# Patient Record
Sex: FEMALE | Race: WHITE | NOT HISPANIC OR LATINO
[De-identification: names, ages, dates, MRNs, and addresses within clinical notes are randomized per-mention and may not be internally consistent; named-entity substitution may affect disease eponyms.]

---

## 2017-01-05 ENCOUNTER — MESSAGE (OUTPATIENT)
Age: 82
End: 2017-01-05

## 2017-01-05 RX ORDER — CALCIUM CARBONATE 160(400)MG
TABLET,CHEWABLE ORAL
Qty: 1 | Refills: 0 | Status: ACTIVE | OUTPATIENT
Start: 2017-01-05

## 2017-01-11 ENCOUNTER — MESSAGE (OUTPATIENT)
Age: 82
End: 2017-01-11

## 2017-01-19 ENCOUNTER — INPATIENT (INPATIENT)
Facility: HOSPITAL | Age: 82
LOS: 1 days | Discharge: HOME | DRG: 641 | End: 2017-01-21
Attending: INTERNAL MEDICINE | Admitting: INTERNAL MEDICINE
Payer: COMMERCIAL

## 2017-01-19 VITALS
RESPIRATION RATE: 18 BRPM | OXYGEN SATURATION: 96 % | TEMPERATURE: 98 F | HEART RATE: 65 BPM | DIASTOLIC BLOOD PRESSURE: 60 MMHG | SYSTOLIC BLOOD PRESSURE: 95 MMHG

## 2017-01-19 LAB
APPEARANCE UR: CLEAR — SIGNIFICANT CHANGE UP
BILIRUB UR-MCNC: NEGATIVE — SIGNIFICANT CHANGE UP
COLOR SPEC: YELLOW — SIGNIFICANT CHANGE UP
DIFF PNL FLD: NEGATIVE — SIGNIFICANT CHANGE UP
EXTRA SST TUBE: SIGNIFICANT CHANGE UP
GLUCOSE UR QL: NEGATIVE — SIGNIFICANT CHANGE UP
KETONES UR-MCNC: NEGATIVE — SIGNIFICANT CHANGE UP
LACTATE SERPL-SCNC: 0.9 MMOL/L — SIGNIFICANT CHANGE UP (ref 0.5–2)
LEUKOCYTE ESTERASE UR-ACNC: (no result)
NITRITE UR-MCNC: NEGATIVE — SIGNIFICANT CHANGE UP
PH UR: 6 — SIGNIFICANT CHANGE UP (ref 4–8)
PROT UR-MCNC: NEGATIVE MG/DL — SIGNIFICANT CHANGE UP
SP GR SPEC: 1.01 — SIGNIFICANT CHANGE UP (ref 1–1.03)
UROBILINOGEN FLD QL: 0.2 E.U./DL — SIGNIFICANT CHANGE UP

## 2017-01-19 PROCEDURE — 99285 EMERGENCY DEPT VISIT HI MDM: CPT | Mod: 25

## 2017-01-19 PROCEDURE — 71020: CPT | Mod: 26

## 2017-01-19 PROCEDURE — 93010 ELECTROCARDIOGRAM REPORT: CPT

## 2017-01-19 RX ORDER — SODIUM CHLORIDE 9 MG/ML
1000 INJECTION INTRAMUSCULAR; INTRAVENOUS; SUBCUTANEOUS ONCE
Qty: 0 | Refills: 0 | Status: COMPLETED | OUTPATIENT
Start: 2017-01-19 | End: 2017-01-19

## 2017-01-19 RX ORDER — METOPROLOL TARTRATE 50 MG
1 TABLET ORAL
Qty: 0 | Refills: 0 | COMMUNITY

## 2017-01-19 RX ADMIN — SODIUM CHLORIDE 500 MILLILITER(S): 9 INJECTION INTRAMUSCULAR; INTRAVENOUS; SUBCUTANEOUS at 23:38

## 2017-01-19 NOTE — ED CLERICAL - NS ED CLERK NOTE PRE-ARRIVAL INFORMATION; ADDITIONAL PRE-ARRIVAL INFORMATION
89 Y/O HABRSOSI DWYER IS BEING SENT BY DR MORELOS FOR AF ON AC DIASTOLIOC CHF ON LASIX CHRONIC MICROPERFORATION OF BOWEL, SBP 70'S WEAKNESS.

## 2017-01-19 NOTE — ED ADULT NURSE NOTE - OBJECTIVE STATEMENT
patient received to ED A+Ox3 with equal/unlabored breathing and afebrile. patient C/O weakness x few weeks and poor appetite x2 weeks. upon assessment patient looks emaciated. patient denies any N/V/D or Fevers. patient has allergy to penicillin and Hx of A-Fib on Xarelto, Hypothyroidism and HTN.

## 2017-01-19 NOTE — ED ADULT NURSE NOTE - PMH
Atrial fibrillation  Atrial fibrillation  Bronchiectasis  Bronchiectasis  Essential hypertension  Hypertension  Hypothyroidism  Hypothyroidism  Malignant neoplasm of female breast  S/P Left Mastectomy  Rectal prolapse  S/P Colostomy

## 2017-01-20 DIAGNOSIS — N17.9 ACUTE KIDNEY FAILURE, UNSPECIFIED: ICD-10-CM

## 2017-01-20 DIAGNOSIS — E03.9 HYPOTHYROIDISM, UNSPECIFIED: ICD-10-CM

## 2017-01-20 DIAGNOSIS — I48.91 UNSPECIFIED ATRIAL FIBRILLATION: ICD-10-CM

## 2017-01-20 DIAGNOSIS — C50.919 MALIGNANT NEOPLASM OF UNSPECIFIED SITE OF UNSPECIFIED FEMALE BREAST: ICD-10-CM

## 2017-01-20 DIAGNOSIS — R63.8 OTHER SYMPTOMS AND SIGNS CONCERNING FOOD AND FLUID INTAKE: ICD-10-CM

## 2017-01-20 DIAGNOSIS — Z41.8 ENCOUNTER FOR OTHER PROCEDURES FOR PURPOSES OTHER THAN REMEDYING HEALTH STATE: ICD-10-CM

## 2017-01-20 DIAGNOSIS — R53.1 WEAKNESS: ICD-10-CM

## 2017-01-20 DIAGNOSIS — R14.0 ABDOMINAL DISTENSION (GASEOUS): ICD-10-CM

## 2017-01-20 LAB
ALBUMIN SERPL ELPH-MCNC: 2.9 G/DL — LOW (ref 3.4–5)
ALP SERPL-CCNC: 73 U/L — SIGNIFICANT CHANGE UP (ref 40–120)
ALT FLD-CCNC: 25 U/L — SIGNIFICANT CHANGE UP (ref 12–42)
ANION GAP SERPL CALC-SCNC: 10 MMOL/L — SIGNIFICANT CHANGE UP (ref 9–16)
ANION GAP SERPL CALC-SCNC: 12 MMOL/L — SIGNIFICANT CHANGE UP (ref 9–16)
APTT BLD: 37.7 SEC — HIGH (ref 27.5–37.4)
AST SERPL-CCNC: 22 U/L — SIGNIFICANT CHANGE UP (ref 15–37)
BASOPHILS NFR BLD AUTO: 0.2 % — SIGNIFICANT CHANGE UP (ref 0–2)
BASOPHILS NFR BLD AUTO: 0.2 % — SIGNIFICANT CHANGE UP (ref 0–2)
BILIRUB SERPL-MCNC: 0.4 MG/DL — SIGNIFICANT CHANGE UP (ref 0.2–1.2)
BUN SERPL-MCNC: 39 MG/DL — HIGH (ref 7–23)
BUN SERPL-MCNC: 41 MG/DL — HIGH (ref 7–23)
CALCIUM SERPL-MCNC: 7.9 MG/DL — LOW (ref 8.5–10.5)
CALCIUM SERPL-MCNC: 8.5 MG/DL — SIGNIFICANT CHANGE UP (ref 8.5–10.5)
CHLORIDE SERPL-SCNC: 110 MMOL/L — HIGH (ref 96–108)
CHLORIDE SERPL-SCNC: 115 MMOL/L — HIGH (ref 96–108)
CK SERPL-CCNC: 67 U/L — SIGNIFICANT CHANGE UP (ref 26–192)
CO2 SERPL-SCNC: 16 MMOL/L — LOW (ref 22–31)
CO2 SERPL-SCNC: 22 MMOL/L — SIGNIFICANT CHANGE UP (ref 22–31)
CREAT SERPL-MCNC: 1.08 MG/DL — SIGNIFICANT CHANGE UP (ref 0.5–1.3)
CREAT SERPL-MCNC: 1.36 MG/DL — HIGH (ref 0.5–1.3)
EOSINOPHIL NFR BLD AUTO: 0.6 % — SIGNIFICANT CHANGE UP (ref 0–6)
EOSINOPHIL NFR BLD AUTO: 0.6 % — SIGNIFICANT CHANGE UP (ref 0–6)
GLUCOSE SERPL-MCNC: 87 MG/DL — SIGNIFICANT CHANGE UP (ref 70–99)
GLUCOSE SERPL-MCNC: 90 MG/DL — SIGNIFICANT CHANGE UP (ref 70–99)
HCT VFR BLD CALC: 36 % — SIGNIFICANT CHANGE UP (ref 34.5–45)
HCT VFR BLD CALC: 37.1 % — SIGNIFICANT CHANGE UP (ref 34.5–45)
HGB BLD-MCNC: 12 G/DL — SIGNIFICANT CHANGE UP (ref 11.5–15.5)
HGB BLD-MCNC: 12.5 G/DL — SIGNIFICANT CHANGE UP (ref 11.5–15.5)
INR BLD: 2.1 — HIGH (ref 0.88–1.16)
INR BLD: 2.21 — HIGH (ref 0.88–1.16)
LYMPHOCYTES # BLD AUTO: 16.5 % — SIGNIFICANT CHANGE UP (ref 13–44)
LYMPHOCYTES # BLD AUTO: 18.2 % — SIGNIFICANT CHANGE UP (ref 13–44)
MAGNESIUM SERPL-MCNC: 1.8 MG/DL — SIGNIFICANT CHANGE UP (ref 1.6–2.4)
MCHC RBC-ENTMCNC: 31.2 PG — SIGNIFICANT CHANGE UP (ref 27–34)
MCHC RBC-ENTMCNC: 31.4 PG — SIGNIFICANT CHANGE UP (ref 27–34)
MCHC RBC-ENTMCNC: 33.3 G/DL — SIGNIFICANT CHANGE UP (ref 32–36)
MCHC RBC-ENTMCNC: 33.7 G/DL — SIGNIFICANT CHANGE UP (ref 32–36)
MCV RBC AUTO: 92.5 FL — SIGNIFICANT CHANGE UP (ref 80–100)
MCV RBC AUTO: 94.2 FL — SIGNIFICANT CHANGE UP (ref 80–100)
MONOCYTES NFR BLD AUTO: 9.2 % — SIGNIFICANT CHANGE UP (ref 2–14)
MONOCYTES NFR BLD AUTO: 9.4 % — SIGNIFICANT CHANGE UP (ref 2–14)
NEUTROPHILS NFR BLD AUTO: 71.8 % — SIGNIFICANT CHANGE UP (ref 43–77)
NEUTROPHILS NFR BLD AUTO: 73.3 % — SIGNIFICANT CHANGE UP (ref 43–77)
PHOSPHATE SERPL-MCNC: 3.5 MG/DL — SIGNIFICANT CHANGE UP (ref 2.5–4.5)
PLATELET # BLD AUTO: 142 K/UL — LOW (ref 150–400)
PLATELET # BLD AUTO: 185 K/UL — SIGNIFICANT CHANGE UP (ref 150–400)
POTASSIUM SERPL-MCNC: 3.8 MMOL/L — SIGNIFICANT CHANGE UP (ref 3.5–5.3)
POTASSIUM SERPL-MCNC: 4.1 MMOL/L — SIGNIFICANT CHANGE UP (ref 3.5–5.3)
POTASSIUM SERPL-SCNC: 3.8 MMOL/L — SIGNIFICANT CHANGE UP (ref 3.5–5.3)
POTASSIUM SERPL-SCNC: 4.1 MMOL/L — SIGNIFICANT CHANGE UP (ref 3.5–5.3)
PROT SERPL-MCNC: 6.4 G/DL — SIGNIFICANT CHANGE UP (ref 6.4–8.2)
PROTHROM AB SERPL-ACNC: 23.5 SEC — HIGH (ref 10–13.1)
PROTHROM AB SERPL-ACNC: 24.7 SEC — HIGH (ref 10–13.1)
RBC # BLD: 3.82 M/UL — SIGNIFICANT CHANGE UP (ref 3.8–5.2)
RBC # BLD: 4.01 M/UL — SIGNIFICANT CHANGE UP (ref 3.8–5.2)
RBC # FLD: 14 % — SIGNIFICANT CHANGE UP (ref 10.3–16.9)
RBC # FLD: 14.6 % — SIGNIFICANT CHANGE UP (ref 10.3–16.9)
SODIUM SERPL-SCNC: 142 MMOL/L — SIGNIFICANT CHANGE UP (ref 135–145)
SODIUM SERPL-SCNC: 143 MMOL/L — SIGNIFICANT CHANGE UP (ref 135–145)
TSH SERPL-MCNC: 2.88 UIU/ML — SIGNIFICANT CHANGE UP (ref 0.35–4.94)
WBC # BLD: 4.7 K/UL — SIGNIFICANT CHANGE UP (ref 3.8–10.5)
WBC # BLD: 5.3 K/UL — SIGNIFICANT CHANGE UP (ref 3.8–10.5)
WBC # FLD AUTO: 4.7 K/UL — SIGNIFICANT CHANGE UP (ref 3.8–10.5)
WBC # FLD AUTO: 5.3 K/UL — SIGNIFICANT CHANGE UP (ref 3.8–10.5)

## 2017-01-20 PROCEDURE — 93306 TTE W/DOPPLER COMPLETE: CPT | Mod: 26

## 2017-01-20 PROCEDURE — 99222 1ST HOSP IP/OBS MODERATE 55: CPT

## 2017-01-20 PROCEDURE — 71010: CPT | Mod: 26

## 2017-01-20 PROCEDURE — 93010 ELECTROCARDIOGRAM REPORT: CPT

## 2017-01-20 RX ORDER — METOPROLOL TARTRATE 50 MG
50 TABLET ORAL DAILY
Qty: 0 | Refills: 0 | Status: DISCONTINUED | OUTPATIENT
Start: 2017-01-20 | End: 2017-01-21

## 2017-01-20 RX ORDER — RIVAROXABAN 15 MG-20MG
15 KIT ORAL DAILY
Qty: 0 | Refills: 0 | Status: DISCONTINUED | OUTPATIENT
Start: 2017-01-20 | End: 2017-01-21

## 2017-01-20 RX ORDER — LEVOTHYROXINE SODIUM 125 MCG
100 TABLET ORAL DAILY
Qty: 0 | Refills: 0 | Status: DISCONTINUED | OUTPATIENT
Start: 2017-01-20 | End: 2017-01-21

## 2017-01-20 RX ADMIN — RIVAROXABAN 15 MILLIGRAM(S): KIT at 19:46

## 2017-01-20 RX ADMIN — Medication 100 MICROGRAM(S): at 06:33

## 2017-01-20 RX ADMIN — SODIUM CHLORIDE 333.33 MILLILITER(S): 9 INJECTION INTRAMUSCULAR; INTRAVENOUS; SUBCUTANEOUS at 00:29

## 2017-01-20 NOTE — H&P ADULT. - GASTROINTESTINAL COMMENTS
significant abdominal distention, non tender, tympanic throughout, soft, non tender, colostomy in place at left lower quadrant (site is clean and intact)

## 2017-01-20 NOTE — ED PROVIDER NOTE - OBJECTIVE STATEMENT
89 yof pw generalized weakness, dec PO intake, hypotension (per visiting RN).  denies vomiting, no cough, no sob, no abd pain, no prior abd surgeries.  no myalgia.  no other complaints.  sx 1-2 wks

## 2017-01-20 NOTE — ED PROVIDER NOTE - MEDICAL DECISION MAKING DETAILS
hypotension (on metoprolol), no tachycardia on exam, per PMD usually a little higher than 90s systolic, afebrile in ED, will check labs, sepsis work up and eval for possible source, gentle hydration, abx as needed, clinically dehydration, will admit

## 2017-01-20 NOTE — CHART NOTE - NSCHARTNOTEFT_GEN_A_CORE
Upon Nutritional Assessment by the Registered Dietitian your patient was determined to meet criteria / has evidence of the following diagnosis/diagnoses:          [ ]  Mild Protein Calorie Malnutrition        [x ]  Moderate Protein Calorie Malnutrition        [ ] Severe Protein Calorie Malnutrition        [ ] Unspecified Protein Calorie Malnutrition        [x ] Underweight / BMI <19        [ ] Morbid Obesity / BMI > 40      Findings as based on:  •  Comprehensive nutrition assessment and consultation  •  Calorie counts (nutrient intake analysis)  •  Food acceptance and intake status from observations by staff  •  Follow up  •  Patient education  •  Intervention secondary to interdisciplinary rounds  •   concerns      Treatment:    1. Recommend adding Ensure Plus TID (1050kcal, 39g pro)  2. Encouraged increased intake, as tolerated, to better meet estimated nutrient needs   3. Appreciate continued assistance and encouragement at meal times  4. Recommend Multivitamin daily       PROVIDER Section:     By signing this assessment you are acknowledging and agree with the diagnosis/diagnoses assigned by the Registered Dietitian    Comments: Upon Nutritional Assessment by the Registered Dietitian your patient was determined to meet criteria / has evidence of the following diagnosis/diagnoses:          [ ]  Mild Protein Calorie Malnutrition        [ ]  Moderate Protein Calorie Malnutrition        [x ] Severe Protein Calorie Malnutrition        [ ] Unspecified Protein Calorie Malnutrition        [x ] Underweight / BMI <19        [ ] Morbid Obesity / BMI > 40      Findings as based on:  •  Comprehensive nutrition assessment and consultation  •  Calorie counts (nutrient intake analysis)  •  Food acceptance and intake status from observations by staff  •  Follow up  •  Patient education  •  Intervention secondary to interdisciplinary rounds  •   concerns      Treatment:    1. Recommend adding Ensure Plus TID (1050kcal, 39g pro)  2. Encouraged increased intake, as tolerated, to better meet estimated nutrient needs   3. Appreciate continued assistance and encouragement at meal times  4. Recommend Multivitamin daily       PROVIDER Section:     By signing this assessment you are acknowledging and agree with the diagnosis/diagnoses assigned by the Registered Dietitian    Comments:

## 2017-01-20 NOTE — CONSULT NOTE ADULT - ASSESSMENT
Admitted for:  1- Weakness generalized.    - Monitor PO intake this morning  - Continue to hold lasix  - Dr Del Rio made aware of patient's admission  -PT consutl-patient presenting with increasing weakness for past few weeks associated with decreased PO intake, low BP, and unintentional weight loss - differential includes possible malignancy vs medication effect vs infection (although less likely given chronicity of symptoms)  -no focal neuro deficits on exam, less likely neuro related  -given weight loss and weakness and appearing cachetic on exam with hx of ?abdominal malignancy, would consider malignancy as contributing factor to weakness - will obtain collateral in AM to clarify if further workup needed  -takes metoprolol 100mg xl and olemsartan 20mg at home, recently stopped lasix 40mg and norvasc 5mg - will hold off on further anti-htn for now in setting of SBP 90-100s  -patient did not meet SIRS criteria, although positive U/A but asymptomatic, will hold off on further abx for now - f/u blood and urine cx; CXR without clear infiltrate   -with reports of dyspnea on exertion and LE edema, will discuss checking Echo  -PT consult in AM    ·2- BITA (acute kidney injury).  Plan: -Cr 1.36 on admission, increased from 0.68 in Dec 2016   -likely pre-renal 2/2 dehydration vs intra-renal from ARB   -obtain urine lytes, hold nephrotoxic meds, and trend BMP.

## 2017-01-20 NOTE — H&P ADULT. - ASSESSMENT
89F with hx of Afib (on xarelto), HTN, hypothyroidism breast cancer s/p L mastectomy, colostomy 2/2 ?perforated diverticula presents from home with complaints of weakness and low BP.

## 2017-01-20 NOTE — H&P ADULT. - PROBLEM SELECTOR PLAN 4
-TSH 2.878, as per patient recently increased levothyroxine from 88mcg to 100mcg - will continue with 100mcg

## 2017-01-20 NOTE — H&P ADULT. - PROBLEM SELECTOR PLAN 3
-EKG with Afib, HR 70s  -c/w home xarelto   -hold metoprolol xl 100mg for now in setting of low BP   -will obtain collateral in AM

## 2017-01-20 NOTE — PROGRESS NOTE ADULT - SUBJECTIVE AND OBJECTIVE BOX
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 The patient underwent an exploratory lap on 7/8/15 because of the finding of free air in the abdominal cavity on a CT scan of the abdomen. All that was found was extensive small and large bowel diverticulosis. No perforation was found. Peritoneal fluid cytology revealed malignant cells c/w adenocarcinoma. IHC staining suggested a Mullerian/ovarian origin. The patient has had no treatment for this cancer. She has felt poorly over the last several months. Over the last few weeks she has noted a low BP, weakness, SOB on exertion and a poor appetite. She has lost 10-15 lbs. over the last several months. She recently had leg edema but this is now less. She went to the ER yesterday because of these complaints and was admitted.    PMI: The patient underwent a left mastectomy in  at age 57. The patient has a colostomy since 2011 related to a rectal prolapse repair. Hx. of HTN. Hypothyroidsim. Hx. of A. Fib. Failed cardioversion.          CHEMOTHERAPY REGIMEN:        Day:                          Diet:  Protocol:                                    IVF:      MEDICATIONS  (STANDING):  rivaroxaban 15milliGRAM(s) Oral daily  levothyroxine 100MICROGram(s) Oral daily  metoprolol succinate ER 50milliGRAM(s) Oral daily    MEDICATIONS  (PRN):      Allergies    penicillins (Hives)    Intolerances        DVT Prophylaxis: [ ] YES [ ] NO      Antibiotics: [ ] YES [ ] NO    Pain Scale (1-10):       Location:    Vital Signs Last 24 Hrs  T(C): 36.4, Max: 36.7 ( @ 23:54)  T(F): 97.5, Max: 98.1 ( @ 23:54)  HR: 56 (56 - 71)  BP: 108/54 (95/60 - 108/65)  BP(mean): --  RR: 16 (16 - 18)  SpO2: 98% (95% - 98%)    Drug Dosing Weight  Height (cm): 172.7 (2017 02:30)  Weight (kg): 43.4 (2017 02:30)  BMI (kg/m2): 14.6 (2017 02:30)  BSA (m2): 1.49 (2017 02:30)    PHYSICAL EXAM:      Constitutional: concerned about how she has been feeling of late..  Eyes: conjunctiva pink.  ENMT: buccal mucosa a bit dry.  Neck: no masses.  Breasts: s/p left mastectomy. No evidence of local or regional recurrence. Right breast without masses.  Respiratory: rales at bases bilaterally.  Cardiovascular: S1>S2 at apex, irregular rhythm  Gastrointestinal: distended, tympanitic, soft throughout, nontender, active bowel sounds. No palp masses. Colostomy left side.  Genitourinary: voiding without difficulty.  Extremities: can move all 4 on command.  Vascular: some leg edema bilaterally.  Neurological: no gross focal deficits.  Skin: warm and dry.  Lymph Nodes: none palp.  Psychiatric: affect normal, concerned.        URINARY CATHETER: [ ] YES [ ] NO     LABS:  CBC Full  -  ( 2017 07:41 )  WBC Count : 4.7 K/uL  Hemoglobin : 12.0 g/dL  Hematocrit : 36.0 %  Platelet Count - Automated : 142 K/uL  Mean Cell Volume : 94.2 fL  Mean Cell Hemoglobin : 31.4 pg  Mean Cell Hemoglobin Concentration : 33.3 g/dL  Auto Neutrophil # : x  Auto Lymphocyte # : x  Auto Monocyte # : x  Auto Eosinophil # : x  Auto Basophil # : x  Auto Neutrophil % : 71.8 %  Auto Lymphocyte % : 18.2 %  Auto Monocyte % : 9.2 %  Auto Eosinophil % : 0.6 %  Auto Basophil % : 0.2 %    2017 07:41    143    |  115    |  39     ----------------------------<  90     3.8     |  16     |  1.08     Ca    7.9        2017 07:41  Phos  3.5       2017 07:41  Mg     1.8       2017 07:41    TPro  6.4    /  Alb  2.9    /  TBili  0.4    /  DBili  x      /  AST  22     /  ALT  25     /  AlkPhos  73     2017 23:42    PT/INR - ( 2017 07:41 )   PT: 24.7 sec;   INR: 2.21          PTT - ( 2017 23:42 )  PTT:37.7 sec  Urinalysis Basic - ( 2017 23:02 )    Color: Yellow / Appearance: Clear / S.015 / pH: x  Gluc: x / Ketone: NEGATIVE  / Bili: NEGATIVE / Urobili: 0.2 E.U./dL   Blood: x / Protein: NEGATIVE mg/dL / Nitrite: NEGATIVE   Leuk Esterase: Small / RBC: < 5 /HPF / WBC > 10 /HPF   Sq Epi: x / Non Sq Epi: Rare /HPF / Bacteria: Present /HPF        CULTURES:    RADIOLOGY & ADDITIONAL STUDIES:            CHEMOTHERAPY REGIMEN:        Day:                          Diet:  Protocol:                                    IVF:      MEDICATIONS  (STANDING):  rivaroxaban 15milliGRAM(s) Oral daily  levothyroxine 100MICROGram(s) Oral daily   metoprolol succinate ER 50milliGRAM(s) Oral daily    MEDICATIONS  (PRN):      Allergies    penicillins (Hives)    Intolerances        DVT Prophylaxis: [ ] YES [ ] NO      Antibiotics: [ ] YES [ ] NO    Pain Scale (1-10):       Location:    Vital Signs Last 24 Hrs  T(C): 36.4, Max: 36.7 ( @ 23:54)  T(F): 97.5, Max: 98.1 ( @ 23:54)  HR: 56 (56 - 71)  BP: 108/54 (95/60 - 108/65)  BP(mean): --  RR: 16 (16 - 18)  SpO2: 98% (95% - 98%)    Drug Dosing Weight  Height (cm): 172.7 (2017 02:30)  Weight (kg): 43.4 (2017 02:30)  BMI (kg/m2): 14.6 (2017 02:30)  BSA (m2): 1.49 (2017 02:30)    PHYSICAL EXAM:      Constitutional:    Eyes:    ENMT:    Neck:    Breasts:    Back:    Respiratory:    Cardiovascular:    Gastrointestinal:    Genitourinary:    Rectal:    Extremities:    Vascular:    Neurological:    Skin:    Lymph Nodes:    Musculoskeletal:    Psychiatric:        URINARY CATHETER: [ ] YES [ ] NO     LABS:  CBC Full  -  ( 2017 07:41 )  WBC Count : 4.7 K/uL  Hemoglobin : 12.0 g/dL  Hematocrit : 36.0 %  Platelet Count - Automated : 142 K/uL  Mean Cell Volume : 94.2 fL  Mean Cell Hemoglobin : 31.4 pg  Mean Cell Hemoglobin Concentration : 33.3 g/dL  Auto Neutrophil # : x  Auto Lymphocyte # : x  Auto Monocyte # : x  Auto Eosinophil # : x  Auto Basophil # : x  Auto Neutrophil % : 71.8 %  Auto Lymphocyte % : 18.2 %  Auto Monocyte % : 9.2 %  Auto Eosinophil % : 0.6 %  Auto Basophil % : 0.2 %    2017 07:41    143    |  115    |  39     ----------------------------<  90     3.8     |  16     |  1.08     Ca    7.9        2017 07:41  Phos  3.5       2017 07:41  Mg     1.8       2017 07:41    TPro  6.4    /  Alb  2.9    /  TBili  0.4    /  DBili  x      /  AST  22     /  ALT  25     /  AlkPhos  73     2017 23:42    PT/INR - ( 2017 07:41 )   PT: 24.7 sec;   INR: 2.21          PTT - ( 2017 23:42 )  PTT:37.7 sec  Urinalysis Basic - ( 2017 23:02 )    Color: Yellow / Appearance: Clear / S.015 / pH: x  Gluc: x / Ketone: NEGATIVE  / Bili: NEGATIVE / Urobili: 0.2 E.U./dL   Blood: x / Protein: NEGATIVE mg/dL / Nitrite: NEGATIVE   Leuk Esterase: Small / RBC: < 5 /HPF / WBC > 10 /HPF   Sq Epi: x / Non Sq Epi: Rare /HPF / Bacteria: Present /HPF        CULTURES:    RADIOLOGY & ADDITIONAL STUDIES: The patient is an 88yo female known to me because of a 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 The patient underwent an exploratory lap on 7/8/15 because of the finding of free air in the abdominal cavity on a CT scan of the abdomen. All that was found was extensive small and large bowel diverticulosis. No perforation was found. Peritoneal fluid cytology revealed malignant cells c/w adenocarcinoma. IHC staining suggested a Mullerian/ovarian origin. The patient has had no treatment for this cancer. She has felt poorly over the last several months. Over the last few weeks she has noted a low BP, weakness, SOB on exertion and a poor appetite. She has lost 10-15 lbs. over the last several months. She recently had leg edema but this is now less. She went to the ER yesterday because of these complaints and was admitted.    PMH: The patient underwent a left mastectomy in  at age 57. The patient has a colostomy since 2011 related to a rectal prolapse repair. Hx. of HTN. Hypothyroidism. Hx. of A. Fib. Failed cardioversion.          CHEMOTHERAPY REGIMEN:        Day:                          Diet:  Protocol:                                    IVF:      MEDICATIONS  (STANDING):  rivaroxaban 15milliGRAM(s) Oral daily  levothyroxine 100MICROGram(s) Oral daily  metoprolol succinate ER 50milliGRAM(s) Oral daily    MEDICATIONS  (PRN):      Allergies    penicillins (Hives)    Intolerances        DVT Prophylaxis: [ ] YES [ ] NO      Antibiotics: [ ] YES [ ] NO    Pain Scale (1-10):       Location:    Vital Signs Last 24 Hrs  T(C): 36.4, Max: 36.7 ( @ 23:54)  T(F): 97.5, Max: 98.1 ( @ 23:54)  HR: 56 (56 - 71)  BP: 108/54 (95/60 - 108/65)  BP(mean): --  RR: 16 (16 - 18)  SpO2: 98% (95% - 98%)    Drug Dosing Weight  Height (cm): 172.7 (2017 02:30)  Weight (kg): 43.4 (2017 02:30)  BMI (kg/m2): 14.6 (2017 02:30)  BSA (m2): 1.49 (2017 02:30)    PHYSICAL EXAM:      Constitutional: concerned about how she has been feeling of late..  Eyes: conjunctiva pink.  ENMT: buccal mucosa a bit dry.  Neck: no masses.  Breasts: s/p left mastectomy. No evidence of local or regional recurrence. Right breast without masses.  Respiratory: rales at bases bilaterally.  Cardiovascular: S1>S2 at apex, irregular rhythm  Gastrointestinal: distended, tympanitic, soft throughout, nontender, active bowel sounds. No palp masses. Colostomy left side.  Genitourinary: voiding without difficulty.  Extremities: can move all 4 on command.  Vascular: some leg edema bilaterally.  Neurological: no gross focal deficits.  Skin: warm and dry.  Lymph Nodes: none palp.  Psychiatric: affect normal, concerned.        URINARY CATHETER: [ ] YES [ ] NO     LABS:  CBC Full  -  ( 2017 07:41 )  WBC Count : 4.7 K/uL  Hemoglobin : 12.0 g/dL  Hematocrit : 36.0 %  Platelet Count - Automated : 142 K/uL  Mean Cell Volume : 94.2 fL  Mean Cell Hemoglobin : 31.4 pg  Mean Cell Hemoglobin Concentration : 33.3 g/dL  Auto Neutrophil # : x  Auto Lymphocyte # : x  Auto Monocyte # : x  Auto Eosinophil # : x  Auto Basophil # : x  Auto Neutrophil % : 71.8 %  Auto Lymphocyte % : 18.2 %  Auto Monocyte % : 9.2 %  Auto Eosinophil % : 0.6 %  Auto Basophil % : 0.2 %    2017 07:41    143    |  115    |  39     ----------------------------<  90     3.8     |  16     |  1.08     Ca    7.9        2017 07:41  Phos  3.5       2017 07:41  Mg     1.8       2017 07:41    TPro  6.4    /  Alb  2.9    /  TBili  0.4    /  DBili  x      /  AST  22     /  ALT  25     /  AlkPhos  73     2017 23:42    PT/INR - ( 2017 07:41 )   PT: 24.7 sec;   INR: 2.21          PTT - ( 2017 23:42 )  PTT:37.7 sec  Urinalysis Basic - ( 2017 23:02 )    Color: Yellow / Appearance: Clear / S.015 / pH: x  Gluc: x / Ketone: NEGATIVE  / Bili: NEGATIVE / Urobili: 0.2 E.U./dL   Blood: x / Protein: NEGATIVE mg/dL / Nitrite: NEGATIVE   Leuk Esterase: Small / RBC: < 5 /HPF / WBC > 10 /HPF   Sq Epi: x / Non Sq Epi: Rare /HPF / Bacteria: Present /HPF        CULTURES:    RADIOLOGY & ADDITIONAL STUDIES:

## 2017-01-20 NOTE — H&P ADULT. - PROBLEM SELECTOR PLAN 7
FEN - dash diet, replete lytes, NS at 80cc/hr  Status - full code  Dispo - admit to [facility] FEN - dash diet, replete lytes  Status - full code  Dispo - admit to [facility]

## 2017-01-20 NOTE — ED PROVIDER NOTE - CARE PLAN
Principal Discharge DX:	Weakness generalized  Secondary Diagnosis:	Urinary tract infection  Secondary Diagnosis:	Dehydration

## 2017-01-20 NOTE — ED PROVIDER NOTE - PHYSICAL EXAMINATION
CON: ao x 3, thin, HENMT: clear oropharynx, dry mucous membrane, soft neck, HEAD: atraumatic, CV: rrr, equal pulses b/l, RESP: crackles LLL, GI: +BS, soft, nontender, no rebound, no guarding, SKIN: no rash, MSK: moving all extremities spontaneously, NEURO: ambulatory, conversing appropriately,

## 2017-01-20 NOTE — H&P ADULT. - HISTORY OF PRESENT ILLNESS
89F with hx of Afib (on xarelto), HTN, hypothyroidism breast cancer s/p L mastectomy, colostomy 2/2 ?perforated diverticula presents from home with complaints of weakness and low BP. She reports over past few weeks she has become increasingly more tired, described as generalized weakness and fatigue, and decreased PO intake. She says she previously "a good eater" but recently has not had an appetite and has had about 20 pound unintentional weight loss over past year. She also has noticed increased dyspnea on exertion as well as lightheadedness on exertion. Her home aide checked her BP the other day and was noted to be low in the 90s and called Dr Finnegan who counseled her to stop taking her lasix. She reports BP has been consistently low so she finally came to ED. She also notes recent increase in levothyroxine from 88mcg to 100mcg a few months ago secondary to prior reports of fatigue. No other new medications. Of note, as per son at bedside, she had abdominal surgery with colostomy placement in 2010 for "free air" possibly 2/2 perforated diverticulum. 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 No treatment was initiated. Also notes chronic LE swelling, improved from past few days. Patient denies fever, chills, nausea, headache, blurry vision, sore throat, chest pain, dyspnea, cough, abdominal pain, dysuria, or paresthesias.     In the ED, Tmax 98.1, BP /60-65, HR 65-71, RR 18, O2 96% on RA. Received 2L NS and started on NS at 80cc/hr. Also received levaquin 500mg x1. 89F with hx of Afib (on xarelto), HTN, hypothyroidism breast cancer s/p L mastectomy, colostomy 2/2 ?perforated diverticula presents from home with complaints of weakness and low BP. She reports over past few weeks she has become increasingly more tired, described as generalized weakness and fatigue, and decreased PO intake. She says she previously "a good eater" but recently has not had an appetite and has had about 20 pound unintentional weight loss over past year. She also has noticed increased dyspnea on exertion as well as lightheadedness on exertion. Her home aide checked her BP the other day and was noted to be low in the 90s and called Dr Finnegan who counseled her to stop taking her lasix. She reports BP has been consistently low so she finally came to ED. She also notes recent increase in levothyroxine from 88mcg to 100mcg a few months ago secondary to prior reports of fatigue. No other new medications. Of note, as per son at bedside, she had abdominal surgery with colostomy placement in 2010 for "free air" possibly 2/2 perforated diverticulum. 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 No treatment was initiated. Also notes chronic LE swelling, improved from past few days. Patient denies fever, chills, nausea, headache, blurry vision, sore throat, chest pain, dyspnea, cough, abdominal pain, dysuria, or paresthesias.     In the ED, Tmax 98.1, BP /60-65, HR 65-71, RR 18, O2 96% on RA. Received 2L NS and levaquin 500mg x1.

## 2017-01-20 NOTE — PROGRESS NOTE ADULT - SUBJECTIVE AND OBJECTIVE BOX
Patient seen and examined, Chart reviewed    HPI:  89F with hx of Afib (on xarelto), HTN, hypothyroidism breast cancer s/p L mastectomy, colostomy 2/2 ?perforated diverticula presents from home with complaints of weakness and low BP. She reports over past few weeks she has become increasingly more tired, described as generalized weakness and fatigue, and decreased PO intake. She says she previously "a good eater" but recently has not had an appetite and has had about 20 pound unintentional weight loss over past year. She also has noticed increased dyspnea on exertion as well as lightheadedness on exertion. Her home aide checked her BP the other day and was noted to be low in the 90s and called Dr Finnegan who counseled her to stop taking her lasix. She reports BP has been consistently low so she finally came to ED. She also notes recent increase in levothyroxine from 88mcg to 100mcg a few months ago secondary to prior reports of fatigue. No other new medications. Of note, as per son at bedside, she had abdominal surgery with colostomy placement in  for "free air" possibly 2/2 perforated diverticulum. 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 No treatment was initiated. Also notes chronic LE swelling, improved from past few days. Patient denies fever, chills, nausea, headache, blurry vision, sore throat, chest pain, dyspnea, cough, abdominal pain, dysuria, or paresthesias.     In the ED, Tmax 98.1, BP /60-65, HR 65-71, RR 18, O2 96% on RA. Received 2L NS and levaquin 500mg x1. (2017 02:46)      PAST MEDICAL & SURGICAL HISTORY:  Atrial fibrillation: Atrial fibrillation  Essential hypertension: Hypertension  Hypothyroidism: Hypothyroidism  Malignant neoplasm of female breast: S/P Left Mastectomy  Bronchiectasis: Bronchiectasis  Rectal prolapse: S/P Colostomy  Acquired absence of breast: S/P mastectomy, left  Status post colostomy: S/P colostomy      REVIEW OF SYSTEMS:  CONSTITUTIONAL:  No night sweats.  No fatigue,  No fever or chills.  RESPIRATORY:  No cough.  No wheeze.    No shortness of breath.  CARDIOVASCULAR:  No chest pains.  No palpitations.  GASTROINTESTINAL:  No abdominal pain.  No nausea or vomiting.  No diarrhea or constipation.    GENITOURINARY:  No urgency.  No frequency.     MUSCULOSKELETAL:  difficulty walking, falling a lot  SKIN:  No rashes.  No lesions.  No wounds.      rivaroxaban 15milliGRAM(s) Oral daily  levothyroxine 100MICROGram(s) Oral daily      Allergies    penicillins (Hives)      FAMILY HISTORY:  Family history of cardiovascular disease: Family history of hypertension in mother      Vital Signs Last 24 Hrs  T(C): 36.1, Max: 36.7 ( @ 23:54)  T(F): 97, Max: 98.1 ( @ 23:54)  HR: 71 (65 - 71)  BP: 108/56 (95/60 - 108/65)  BP(mean): --  RR: 16 (16 - 18)  SpO2: 95% (95% - 98%)    I & Os for current day (as of  @ 08:16)  =============================================  IN: 0 ml / OUT: 200 ml / NET: -200 ml      PHYSICAL EXAM:   General - NAD, Alert and oriented x 3,   Cardiovascular - RRR no m/r/g, no JVD  Lungs - Clear to auscltation, no use of acessory muscles, no crackles or wheezes.  Skin - No rashes, skin warm and dry, no erythematous areas  Abdomen - Normal bowel sounds, abdomen soft and nontender.  Extremeties -1 +  edema,  nocyanosis or clubbing      LABS:                        12.5   5.3   )-----------( 185      ( 2017 23:42 )             37.1     2017 07:41    143    |  115    |  39     ----------------------------<  90     3.8     |  16     |  1.08     Ca    7.9        2017 07:41  Phos  3.5       2017 07:41  Mg     1.8       2017 07:41    TPro  6.4    /  Alb  2.9    /  TBili  0.4    /  DBili  x      /  AST  22     /  ALT  25     /  AlkPhos  73     2017 23:42    PT/INR - ( 2017 07:41 )   PT: 24.7 sec;   INR: 2.21          PTT - ( 2017 23:42 )  PTT:37.7 sec  Urinalysis Basic - ( 2017 23:02 )    Color: Yellow / Appearance: Clear / S.015 / pH: x  Gluc: x / Ketone: NEGATIVE  / Bili: NEGATIVE / Urobili: 0.2 E.U./dL   Blood: x / Protein: NEGATIVE mg/dL / Nitrite: NEGATIVE   Leuk Esterase: Small / RBC: < 5 /HPF / WBC > 10 /HPF   Sq Epi: x / Non Sq Epi: Rare /HPF / Bacteria: Present /HPF        RADIOLOGY & ADDITIONAL STUDIES:

## 2017-01-20 NOTE — PROGRESS NOTE ADULT - ATTENDING COMMENTS
The patient has had these symptoms for some time now albeit they are now more bothersome to her. The question is are her symptoms due to her cancer or some other etiology. I don't think the patient will take any treatment if she is found to have symptomatic carcinomatosis. One could obtain a  level and a CT scan of the A/P to assess her disease status. Will discuss with Dr. Mcdonough.

## 2017-01-20 NOTE — H&P ADULT. - PROBLEM SELECTOR PLAN 1
-patient presenting with increasing weakness for past few weeks associated with decreased PO intake, low BP, and unintentional weight loss - differential includes possible malignancy vs medication effect vs infection (although less likely given chronicity of symptoms)  -no focal neuro deficits on exam, less likely neuro related  -given weight loss and weakness and appearing cachetic on exam with hx of ?abdominal malignancy, would consider malignancy as contributing factor to weakness - will obtain collateral in AM to clarify if further workup needed  -takes metoprolol 100mg xl and olemsartan 20mg at home, recently stopped lasix 40mg and norvasc 5mg - will hold off on further anti-htn for now in setting of SBP 90-100s  -patient did not meet SIRS criteria, although positive U/A but asymptomatic, will hold off on further abx for now - f/u blood and urine cx; CXR without clear infiltrate   -with reports of dyspnea on exertion and LE edema, will discuss checking Echo  -PT consult in AM -patient presenting with increasing weakness for past few weeks associated with decreased PO intake, low BP, and unintentional weight loss - differential includes possible malignancy vs medication effect vs infection (although less likely given chronicity of symptoms)  -no focal neuro deficits on exam, less likely neuro related  -given weight loss and weakness and appearing cachetic on exam with hx of ?abdominal malignancy, would consider malignancy as contributing factor to weakness - will obtain collateral in AM to clarify if further workup needed  -takes metoprolol 100mg xl and olemsartan 20mg at home, recently stopped lasix 40mg and norvasc 5mg - will hold off on further anti-htn for now in setting of SBP 90-100s; received 2L NS without significant improvement in BP, however will hold off on additional fluids given mild JVD and LE swelling and BNP 2000s; patient mentating well, making urine, encourage PO intake and obtain collateral in AM regarding baseline heart function   -patient did not meet SIRS criteria, although positive U/A but asymptomatic, will hold off on further abx for now - f/u blood and urine cx; CXR without clear infiltrate   -with reports of dyspnea on exertion and LE edema, will discuss checking Echo  -PT consult in AM

## 2017-01-20 NOTE — H&P ADULT. - PROBLEM SELECTOR PLAN 5
-patient with significant abdominal distention on exam, however soft and non tender, reportedly chronic, colostomy in place, no change in stool caliber or frequency   -obtain collateral in AM

## 2017-01-20 NOTE — H&P ADULT. - PROBLEM SELECTOR PLAN 2
-Cr 1.36 on admission, increased from 0.68 in Dec 2016   -likely pre-renal 2/2 dehydration vs intra-renal from ARB   -obtain urine lytes, hold nephrotoxic meds, and trend BMP

## 2017-01-20 NOTE — DIETITIAN INITIAL EVALUATION ADULT. - ENERGY NEEDS
IBW used as pt is currently less than 80% ideal body weight.   Increased needs secondary to significant weight loss and underweight BMI consistent with malnutrition and possible catabolic state.

## 2017-01-20 NOTE — CONSULT NOTE ADULT - SUBJECTIVE AND OBJECTIVE BOX
Patient is a 89y old  Female who presents with a chief complaint of Weakness (2017 02:46)      HPI:  89F with hx of Afib (on xarelto), HTN, hypothyroidism breast cancer s/p L mastectomy, colostomy 2/2 ?perforated diverticula presents from home with complaints of weakness and low BP. She reports over past few weeks she has become increasingly more tired, described as generalized weakness and fatigue, and decreased PO intake. She says she previously "a good eater" but recently has not had an appetite and has had about 20 pound unintentional weight loss over past year. She also has noticed increased dyspnea on exertion as well as lightheadedness on exertion. Her home aide checked her BP the other day and was noted to be low in the 90s and called Dr Finnegan who counseled her to stop taking her lasix. She reports BP has been consistently low so she finally came to ED. She also notes recent increase in levothyroxine from 88mcg to 100mcg a few months ago secondary to prior reports of fatigue. No other new medications. Of note, as per son at bedside, she had abdominal surgery with colostomy placement in  for "free air" possibly 2/2 perforated diverticulum. 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 No treatment was initiated. Also notes chronic LE swelling, improved from past few days. Patient denies fever, chills, nausea, headache, blurry vision, sore throat, chest pain, dyspnea, cough, abdominal pain, dysuria, or paresthesias.     In the ED, Tmax 98.1, BP /60-65, HR 65-71, RR 18, O2 96% on RA. Received 2L NS and levaquin 500mg x1. (2017 02:46)      PAST MEDICAL & SURGICAL HISTORY:  Atrial fibrillation: Atrial fibrillation  Essential hypertension: Hypertension  Hypothyroidism: Hypothyroidism  Malignant neoplasm of female breast: S/P Left Mastectomy  Bronchiectasis: Bronchiectasis  Rectal prolapse: S/P Colostomy  Acquired absence of breast: S/P mastectomy, left  Status post colostomy: S/P colostomy      MEDICATIONS  (STANDING):  rivaroxaban 15milliGRAM(s) Oral daily  levothyroxine 100MICROGram(s) Oral daily  metoprolol succinate ER 50milliGRAM(s) Oral daily    MEDICATIONS  (PRN):      Social History: lives alone in an elevator accessible apartment, no home care services    Functional Level Prior to Admission: fully ADL independent, walks without assistive devices, has a cane    FAMILY HISTORY:  Family history of cardiovascular disease: Family history of hypertension in mother      CBC Full  -  ( 2017 07:41 )  WBC Count : 4.7 K/uL  Hemoglobin : 12.0 g/dL  Hematocrit : 36.0 %  Platelet Count - Automated : 142 K/uL  Mean Cell Volume : 94.2 fL  Mean Cell Hemoglobin : 31.4 pg  Mean Cell Hemoglobin Concentration : 33.3 g/dL  Auto Neutrophil # : x  Auto Lymphocyte # : x  Auto Monocyte # : x  Auto Eosinophil # : x  Auto Basophil # : x  Auto Neutrophil % : 71.8 %  Auto Lymphocyte % : 18.2 %  Auto Monocyte % : 9.2 %  Auto Eosinophil % : 0.6 %  Auto Basophil % : 0.2 %      2017 07:41    143    |  115    |  39     ----------------------------<  90     3.8     |  16     |  1.08     Ca    7.9        2017 07:41  Phos  3.5       2017 07:41  Mg     1.8       2017 07:41    TPro  6.4    /  Alb  2.9    /  TBili  0.4    /  DBili  x      /  AST  22     /  ALT  25     /  AlkPhos  73     2017 23:42      Urinalysis Basic - ( 2017 23:02 )    Color: Yellow / Appearance: Clear / S.015 / pH: x  Gluc: x / Ketone: NEGATIVE  / Bili: NEGATIVE / Urobili: 0.2 E.U./dL   Blood: x / Protein: NEGATIVE mg/dL / Nitrite: NEGATIVE   Leuk Esterase: Small / RBC: < 5 /HPF / WBC > 10 /HPF   Sq Epi: x / Non Sq Epi: Rare /HPF / Bacteria: Present /HPF      Radiology:    EXAM:  XR CHEST 1 VIEW PORT URGENT                           PROCEDURE DATE:  2017                 INTERPRETATION:  Portable frontal view examination    History: Abnormal breath sounds breast cancer    Scattered increased lung markings, grossly similar to prior exam   2017. No definite focal infiltrate. No pleural effusion.          Vital Signs Last 24 Hrs  T(C): 36.4, Max: 36.7 ( @ 23:54)  T(F): 97.5, Max: 98.1 ( @ 23:54)  HR: 56 (56 - 71)  BP: 108/54 (95/60 - 108/65)  BP(mean): --  RR: 16 (16 - 18)  SpO2: 98% (95% - 98%)    REVIEW OF SYSTEMS:    CONSTITUTIONAL: fatigue, generalized weakness  EYES: No eye pain, visual disturbances, or discharge  ENMT:  No difficulty hearing, tinnitus, vertigo; No sinus or throat pain  NECK: No pain or stiffness  BREASTS: No pain, masses, or nipple discharge  RESPIRATORY: No cough, wheezing, chills or hemoptysis; No shortness of breath  CARDIOVASCULAR: No chest pain, palpitations, dizziness, or leg swelling  GASTROINTESTINAL: No abdominal or epigastric pain. No nausea, vomiting, or hematemesis; No diarrhea or constipation. No melena or hematochezia.  GENITOURINARY: No dysuria, frequency, hematuria, or incontinence  NEUROLOGICAL: No headaches, memory loss, loss of strength, numbness, or tremors  SKIN: No itching, burning, rashes, or lesions   LYMPH NODES: No enlarged glands  ENDOCRINE: No heat or cold intolerance; No hair loss  MUSCULOSKELETAL: No joint pain or swelling; No muscle, back, or extremity pain  PSYCHIATRIC: No depression, anxiety, mood swings, or difficulty sleeping  HEME/LYMPH: No easy bruising, or bleeding gums  ALLERGY AND IMMUNOLOGIC: No hives or eczema      Physical Exam: cachectic  woman lying in bed, c/o fatigue/generalized weakness    HEENT: normocephalic/ atraumatic, anicteric    Neck: supple, negative JVD, negative carotid bruits,    Chest: CTA bilaterally, neg wheeze, rhonchi, rales, egophany    Cardiovascular: regular rate and rhythm, neg murmurs/rubs/gallops    Abdomen: distended, tympanic, NT, Left colostomy C/D    Extremities: WWP,  1+ LE edema, negative calf tenderness to palpation, negative Jose Alejandro's sign    Neurologic Exam:    Alert and oriented to person, place, date/year, speech fluent w/o dysarthria, repetition intact, comprehension intact,     Cranial Nerves:     II:                      pupils equal, round and reactive to light, visual fields intact   III/ IV/VI:            extraocular movements intact, neg nystagmus, ptosis  V:                     facial sensation intact, V1-3 normal  VII:                    face symmetric, no droop, normal eye closure and smile  VIII:                   hearing intact to finger rub bilaterally  IX/ X:                 soft palate rise symmetrical  XI:                     head turning, shoulder shrug normal  XII:                    tongue midline    Motor Exam:    Bilateral UE:        5/5 /intrinsics                            5/5 biceps/triceps/wrist extensors-flexors/deltoid                            negative pronator drift      Bilateral LE:        5/5 hip flexors/adductors/abductors                            5/5 quadriceps/hamstrings                            5/5 dorsiflexors/plantar flexors/invertors-evertors    Sensory: intact to LT/PP in all UE/LE dermatomes    DTR:      =  biceps/     triceps/     brachioradialis               =   patella/   medial hamstring/    ankle                 neg clonus                 neg Babinski                 neg Hoffmans    Finger to Nose: wnl    Heel to Shin:      wnl    Rapid Alternating movements:  wnl    Joint Position Sense:  intact    Gait:  NT        PM&R Impression:  1) deconditioned  2) no focal neuro deficits      Recommendations:    1) Physical therapy focusing on therapeutic exercises, bed mobility/transfer out of bed evaluation, progressive ambulation with assistive devices.    2) Disposition Plan: anticipate d/c home, recommend outpatient physical therapy

## 2017-01-20 NOTE — DIETITIAN INITIAL EVALUATION ADULT. - OTHER INFO
Pt admitted with weakness, lower back pain, decreased PO intake; concern for malignancy.  Pt endorses ~20lb weight loss over the last year which she attributes to poor appetite/intake.  Pt reports usual body weight ~126lbs x5 years ago.  Pt is currently ~83% UBW.  Weight loss and suboptimal intake are consistent with moderate malnutrition.  Pt endorses good tolerance of diet thus far.  Pt endorses fair appetite; consuming ~50% of meals.  Pt denies GI distress at present; no pain.  NKFA.  Skin: jessica 18. Pt admitted with weakness, lower back pain, decreased PO intake; concern for malignancy.  Pt endorses ~20lb weight loss over the last year which she attributes to poor appetite/intake.  Pt reports usual body weight ~126lbs x5 years ago.  Pt is currently ~83% UBW.  Weight loss and suboptimal intake are consistent with malnutrition.  Pt endorses good tolerance of diet thus far.  Pt endorses fair appetite; consuming ~50% of meals.  Pt denies GI distress at present; no pain.  NKFA.  Skin: jessica 18.

## 2017-01-21 ENCOUNTER — TRANSCRIPTION ENCOUNTER (OUTPATIENT)
Age: 82
End: 2017-01-21

## 2017-01-21 VITALS
SYSTOLIC BLOOD PRESSURE: 92 MMHG | TEMPERATURE: 98 F | OXYGEN SATURATION: 96 % | RESPIRATION RATE: 18 BRPM | DIASTOLIC BLOOD PRESSURE: 55 MMHG | HEART RATE: 55 BPM

## 2017-01-21 DIAGNOSIS — R63.4 ABNORMAL WEIGHT LOSS: ICD-10-CM

## 2017-01-21 DIAGNOSIS — R06.09 OTHER FORMS OF DYSPNEA: ICD-10-CM

## 2017-01-21 DIAGNOSIS — J47.9 BRONCHIECTASIS, UNCOMPLICATED: ICD-10-CM

## 2017-01-21 DIAGNOSIS — C50.919 MALIGNANT NEOPLASM OF UNSPECIFIED SITE OF UNSPECIFIED FEMALE BREAST: ICD-10-CM

## 2017-01-21 DIAGNOSIS — R53.83 OTHER FATIGUE: ICD-10-CM

## 2017-01-21 DIAGNOSIS — R93.8 ABNORMAL FINDINGS ON DIAGNOSTIC IMAGING OF OTHER SPECIFIED BODY STRUCTURES: ICD-10-CM

## 2017-01-21 LAB
ANION GAP SERPL CALC-SCNC: 11 MMOL/L — SIGNIFICANT CHANGE UP (ref 9–16)
BUN SERPL-MCNC: 32 MG/DL — HIGH (ref 7–23)
CALCIUM SERPL-MCNC: 8.1 MG/DL — LOW (ref 8.5–10.5)
CHLORIDE SERPL-SCNC: 115 MMOL/L — HIGH (ref 96–108)
CO2 SERPL-SCNC: 17 MMOL/L — LOW (ref 22–31)
CREAT SERPL-MCNC: 1.11 MG/DL — SIGNIFICANT CHANGE UP (ref 0.5–1.3)
CULTURE RESULTS: NO GROWTH — SIGNIFICANT CHANGE UP
GLUCOSE SERPL-MCNC: 86 MG/DL — SIGNIFICANT CHANGE UP (ref 70–99)
MAGNESIUM SERPL-MCNC: 1.7 MG/DL — SIGNIFICANT CHANGE UP (ref 1.6–2.4)
POTASSIUM SERPL-MCNC: 3.6 MMOL/L — SIGNIFICANT CHANGE UP (ref 3.5–5.3)
POTASSIUM SERPL-SCNC: 3.6 MMOL/L — SIGNIFICANT CHANGE UP (ref 3.5–5.3)
SODIUM SERPL-SCNC: 143 MMOL/L — SIGNIFICANT CHANGE UP (ref 135–145)
SPECIMEN SOURCE: SIGNIFICANT CHANGE UP

## 2017-01-21 PROCEDURE — 83605 ASSAY OF LACTIC ACID: CPT

## 2017-01-21 PROCEDURE — 81003 URINALYSIS AUTO W/O SCOPE: CPT

## 2017-01-21 PROCEDURE — 71045 X-RAY EXAM CHEST 1 VIEW: CPT

## 2017-01-21 PROCEDURE — 87040 BLOOD CULTURE FOR BACTERIA: CPT

## 2017-01-21 PROCEDURE — 82550 ASSAY OF CK (CPK): CPT

## 2017-01-21 PROCEDURE — 80053 COMPREHEN METABOLIC PANEL: CPT

## 2017-01-21 PROCEDURE — 86304 IMMUNOASSAY TUMOR CA 125: CPT

## 2017-01-21 PROCEDURE — 85730 THROMBOPLASTIN TIME PARTIAL: CPT

## 2017-01-21 PROCEDURE — 96374 THER/PROPH/DIAG INJ IV PUSH: CPT

## 2017-01-21 PROCEDURE — 93306 TTE W/DOPPLER COMPLETE: CPT

## 2017-01-21 PROCEDURE — 85610 PROTHROMBIN TIME: CPT

## 2017-01-21 PROCEDURE — 93005 ELECTROCARDIOGRAM TRACING: CPT

## 2017-01-21 PROCEDURE — 80048 BASIC METABOLIC PNL TOTAL CA: CPT

## 2017-01-21 PROCEDURE — 36415 COLL VENOUS BLD VENIPUNCTURE: CPT

## 2017-01-21 PROCEDURE — 81001 URINALYSIS AUTO W/SCOPE: CPT

## 2017-01-21 PROCEDURE — 71046 X-RAY EXAM CHEST 2 VIEWS: CPT

## 2017-01-21 PROCEDURE — 87086 URINE CULTURE/COLONY COUNT: CPT

## 2017-01-21 PROCEDURE — 85025 COMPLETE CBC W/AUTO DIFF WBC: CPT

## 2017-01-21 PROCEDURE — 84443 ASSAY THYROID STIM HORMONE: CPT

## 2017-01-21 PROCEDURE — 84100 ASSAY OF PHOSPHORUS: CPT

## 2017-01-21 PROCEDURE — 83735 ASSAY OF MAGNESIUM: CPT

## 2017-01-21 PROCEDURE — 99285 EMERGENCY DEPT VISIT HI MDM: CPT | Mod: 25

## 2017-01-21 RX ORDER — MAGNESIUM SULFATE 500 MG/ML
1 VIAL (ML) INJECTION ONCE
Qty: 0 | Refills: 0 | Status: COMPLETED | OUTPATIENT
Start: 2017-01-21 | End: 2017-01-21

## 2017-01-21 RX ORDER — POTASSIUM CHLORIDE 20 MEQ
40 PACKET (EA) ORAL ONCE
Qty: 0 | Refills: 0 | Status: COMPLETED | OUTPATIENT
Start: 2017-01-21 | End: 2017-01-21

## 2017-01-21 RX ADMIN — Medication 50 MILLIGRAM(S): at 05:49

## 2017-01-21 RX ADMIN — Medication 40 MILLIEQUIVALENT(S): at 17:01

## 2017-01-21 RX ADMIN — Medication 100 GRAM(S): at 16:55

## 2017-01-21 RX ADMIN — RIVAROXABAN 15 MILLIGRAM(S): KIT at 13:33

## 2017-01-21 RX ADMIN — Medication 100 MICROGRAM(S): at 05:49

## 2017-01-21 NOTE — PROGRESS NOTE ADULT - SUBJECTIVE AND OBJECTIVE BOX
The patient complains of being very weak. She cannot turn to her side in the bed. She reports feeling lightheaded at times. Her appetite remains quite poor.    CHEMOTHERAPY REGIMEN:        Day:                          Diet:  Protocol:                                    IVF:      MEDICATIONS  (STANDING):  rivaroxaban 15milliGRAM(s) Oral daily  levothyroxine 100MICROGram(s) Oral daily  metoprolol succinate ER 50milliGRAM(s) Oral daily    MEDICATIONS  (PRN):      Allergies    penicillins (Hives)    Intolerances        DVT Prophylaxis: [ ] YES [ ] NO      Antibiotics: [ ] YES [ ] NO    Pain Scale (1-10):       Location:    Vital Signs Last 24 Hrs  T(C): 36.3, Max: 36.8 ( @ 19:41)  T(F): 97.3, Max: 98.2 ( @ 19:41)  HR: 76 (55 - 76)  BP: 106/73 (106/73 - 118/70)  BP(mean): --  RR: 16 (16 - 16)  SpO2: 96% (94% - 98%)    Drug Dosing Weight  Height (cm): 172.7 (2017 02:30)  Weight (kg): 43.4 (2017 02:30)  BMI (kg/m2): 14.6 (2017 02:30)  BSA (m2): 1.49 (2017 02:30)    PHYSICAL EXAM:      Constitutional: weak, fatigued, no appetite.  Eyes: conjunctiva pink.  ENMT: buccal mucosa dry.  Neck: no masses.  Breasts: s/p left mastectomy.  Respiratory: creps at both lung bases as before.  Cardiovascular: S1>S2 at apex, slow irregular rhythm.  Gastrointestinal: distended as per ususal for her, tympanitic, soft, nontender, active bowel sounds. No palp masses.  Genitourinary: voiding without difficulty..  Extremities: moves all 4 on command.  Vascular: no clubbing or cyanosis. Edema is less this am.    Skin: warm and dry.  Lymph Nodes: none palp.                URINARY CATHETER: [ ] YES [ ] NO     LABS:  CBC Full  -  ( 2017 07:41 )  WBC Count : 4.7 K/uL  Hemoglobin : 12.0 g/dL  Hematocrit : 36.0 %  Platelet Count - Automated : 142 K/uL  Mean Cell Volume : 94.2 fL  Mean Cell Hemoglobin : 31.4 pg  Mean Cell Hemoglobin Concentration : 33.3 g/dL  Auto Neutrophil # : x  Auto Lymphocyte # : x  Auto Monocyte # : x  Auto Eosinophil # : x  Auto Basophil # : x  Auto Neutrophil % : 71.8 %  Auto Lymphocyte % : 18.2 %  Auto Monocyte % : 9.2 %  Auto Eosinophil % : 0.6 %  Auto Basophil % : 0.2 %    2017 07:41    143    |  115    |  39     ----------------------------<  90     3.8     |  16     |  1.08     Ca    7.9        2017 07:41  Phos  3.5       2017 07:41  Mg     1.8       2017 07:41    TPro  6.4    /  Alb  2.9    /  TBili  0.4    /  DBili  x      /  AST  22     /  ALT  25     /  AlkPhos  73     2017 23:42    PT/INR - ( 2017 07:41 )   PT: 24.7 sec;   INR: 2.21          PTT - ( 2017 23:42 )  PTT:37.7 sec  Urinalysis Basic - ( 2017 23:02 )    Color: Yellow / Appearance: Clear / S.015 / pH: x  Gluc: x / Ketone: NEGATIVE  / Bili: NEGATIVE / Urobili: 0.2 E.U./dL   Blood: x / Protein: NEGATIVE mg/dL / Nitrite: NEGATIVE   Leuk Esterase: Small / RBC: < 5 /HPF / WBC > 10 /HPF   Sq Epi: x / Non Sq Epi: Rare /HPF / Bacteria: Present /HPF        CULTURES:    RADIOLOGY & ADDITIONAL STUDIES:

## 2017-01-21 NOTE — DISCHARGE NOTE ADULT - CARE PLAN
Principal Discharge DX:	Dehydration  Goal:	You came in for weakness and dehydration  Instructions for follow-up, activity and diet:	You were given 2L on fluids with improvement of your symptoms. You were seen by physical therapy who recommended you be discharged emily with PT. As stated below, it was not confirmed if your symptoms were from deconditioning, a malignancy or another origin. It was recommended you have further imaging but after a lengthy discussion at bedside the decision was made to continue these studies as an outpatient. Thus you will be discharge home with physical therapy since you are feeling better. It is recommended you see Dr. Doan for outpatient CT imaging upon discharge.  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 Peritoneal fluid cytology revealed malignant cells c/w adenocarcinoma. IHC staining suggested a Mullerian/ovarian origin. You did not start therapy for this cancer. It could be your presenting symptoms are a result of this. We have spoken at length at bedside with your son about recommendations for CT abdomen/pelvis with contrast during this hospital stay as recommended per Dr. Doan. You stated you will perform these tests as an outpatient as you were feeling better and wanted to leave the hospital to a more comfortable environment. You had a  drawn that you will follow-up as outpatient for results.  Secondary Diagnosis:	Atrial fibrillation, unspecified type  Instructions for follow-up, activity and diet:	Please continue your treatment per your primary care doctor.  Secondary Diagnosis:	Essential hypertension  Instructions for follow-up, activity and diet:	You have high blood pressure, however, your blood pressure was on the lower side this visit so your home medications of metoprolol 100mg xl, olemsartan 20mg, lasix 40mg and norvasc 5mg  were help for systolic pressures of 90-100s. Please see your PMD upon discharge for restarting these medications. Please check your BP regularly.  Secondary Diagnosis:	BITA (acute kidney injury) Principal Discharge DX:	Dehydration  Goal:	You came in for weakness and dehydration  Instructions for follow-up, activity and diet:	You were given 2L on fluids with improvement of your symptoms. You were seen by physical therapy who recommended you be discharged emily with PT. As stated below, it was not confirmed if your symptoms were from deconditioning, a malignancy or another origin. It was recommended you have further imaging but after a lengthy discussion at bedside the decision was made to continue these studies as an outpatient. Thus you will be discharge home with physical therapy since you are feeling better. It is recommended you see Dr. Doan for outpatient CT imaging upon discharge.  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 Peritoneal fluid cytology revealed malignant cells c/w adenocarcinoma. IHC staining suggested a Mullerian/ovarian origin. You did not start therapy for this cancer. It could be your presenting symptoms are a result of this. We have spoken at length at bedside with your son about recommendations for CT abdomen/pelvis with contrast during this hospital stay as recommended per Dr. Doan. You stated you will perform these tests as an outpatient as you were feeling better and wanted to leave the hospital to a more comfortable environment. You had a  drawn that you will follow-up as outpatient for results.  Secondary Diagnosis:	Atrial fibrillation, unspecified type  Instructions for follow-up, activity and diet:	Please continue your treatment per your primary care doctor. Your metoprolol was halved to 50mg xl due to your blood pressure and heart rate. Please follow-up with your PMD about restarting your antihypertensive meds and check your BP.  Secondary Diagnosis:	Essential hypertension  Instructions for follow-up, activity and diet:	You have high blood pressure, however, your blood pressure was on the lower side this visit so your home medications of metoprolol 100mg xl, olemsartan 20mg, lasix 40mg and norvasc 5mg  were help for systolic pressures of 90-100s. Your metoprolol was halved to 50mg xl. Please see your PMD upon discharge for restarting these medications. Please check your BP regularly.  Secondary Diagnosis:	BITA (acute kidney injury) Principal Discharge DX:	Dehydration  Goal:	You came in for weakness and dehydration  Instructions for follow-up, activity and diet:	You were given 2L on fluids with improvement of your symptoms. You were seen by physical therapy who recommended you be discharged home with outpatient PT. You were seen by nutrition who recommended you increase your diet to include Ensure shakes three time daily as supplementation. As stated below, it was not confirmed if your symptoms were from deconditioning, a malignancy or another origin. It was recommended you have further imaging but after a lengthy discussion at bedside the decision was made to continue these studies as an outpatient. Thus you will be discharge home with physical therapy as outpatient since you are feeling better. It is recommended you see Dr. Doan for outpatient CT imaging upon discharge.  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 Peritoneal fluid cytology revealed malignant cells c/w adenocarcinoma. IHC staining suggested a Mullerian/ovarian origin. You did not start therapy for this cancer. It could be your presenting symptoms are a result of this. We have spoken at length at bedside with your son about recommendations for CT abdomen/pelvis with contrast during this hospital stay as recommended per Dr. Doan. You stated you will perform these tests as an outpatient as you were feeling better and wanted to leave the hospital to a more comfortable environment. You had a  drawn that you will follow-up as outpatient for results.  Secondary Diagnosis:	Atrial fibrillation, unspecified type  Instructions for follow-up, activity and diet:	Please continue your treatment per your primary care doctor. Your metoprolol was halved to 50mg xl due to your blood pressure and heart rate. Please follow-up with your PMD about restarting your antihypertensive meds and check your BP.  Secondary Diagnosis:	Essential hypertension  Instructions for follow-up, activity and diet:	You have high blood pressure, however, your blood pressure was on the lower side this visit so your home medications of metoprolol 100mg xl, olemsartan 20mg, lasix 40mg and norvasc 5mg were held for systolic pressures of 90-100s. You were continued on metoprolol that was halved to 50mg xl. Please see your PMD upon discharge for restarting these medications. Please check your BP regularly.  Secondary Diagnosis:	BITA (acute kidney injury)

## 2017-01-21 NOTE — DISCHARGE NOTE ADULT - SECONDARY DIAGNOSIS.
Malignant neoplasm of abdomen Atrial fibrillation, unspecified type Essential hypertension BITA (acute kidney injury)

## 2017-01-21 NOTE — PROGRESS NOTE ADULT - ASSESSMENT
89F with hx of Afib (on xarelto), HTN, hypothyroidism breast cancer s/p L mastectomy, colostomy 2/2 ?perforated diverticula presents from home with complaints of weakness and low BP.Problem:     1- Weakness generalized.    - Monitor PO intake this morning  - Continue to hold lasix  - Dr Del Rio made aware of patient's admission  -PT consutl-patient presenting with increasing weakness for past few weeks associated with decreased PO intake, low BP, and unintentional weight loss - differential includes possible malignancy vs medication effect vs infection (although less likely given chronicity of symptoms)  -no focal neuro deficits on exam, less likely neuro related  -given weight loss and weakness and appearing cachetic on exam with hx of ?abdominal malignancy, would consider malignancy as contributing factor to weakness - will obtain collateral in AM to clarify if further workup needed  -takes metoprolol 100mg xl and olemsartan 20mg at home, recently stopped lasix 40mg and norvasc 5mg - will hold off on further anti-htn for now in setting of SBP 90-100s  -patient did not meet SIRS criteria, although positive U/A but asymptomatic, will hold off on further abx for now - f/u blood and urine cx; CXR without clear infiltrate   -with reports of dyspnea on exertion and LE edema, will discuss checking Echo  -PT consult in AM    ·2- BITA (acute kidney injury).  Plan: -Cr 1.36 on admission, increased from 0.68 in Dec 2016   -likely pre-renal 2/2 dehydration vs intra-renal from ARB   -obtain urine lytes, hold nephrotoxic meds, and trend BMP.       3- : Atrial fibrillation.    -c/w home xarelto   - Monitor HR off metoprolol - please consult Dr Finnegan    4- Hypothyroidism.  Plan: -TSH 2.878, as per patient recently increased levothyroxine from 88mcg to 100mcg - will continue with 100mcg.     5-: Abdominal distension. - unchanged
The patient is weak and fatigued but wants to go home, where she will be alone.
The patient has weakness, fatigue, hypotension and SOB on exertion.

## 2017-01-21 NOTE — PROGRESS NOTE ADULT - PROBLEM SELECTOR PLAN 1
Chronic for her. The patient has a Gyn. malignancy which up till now has not been a problem for her.
with history of breast cancer
May be multifactorial eg. progressive aging, tumor activity. Would consider CT scan of body and obtaining a CA-125 level.

## 2017-01-21 NOTE — DISCHARGE NOTE ADULT - PATIENT PORTAL LINK FT
“You can access the Follow[facility] Patient Portal, offered by [facility], by registering with the following website: [email or web address]”

## 2017-01-21 NOTE — PROGRESS NOTE ADULT - PROBLEM SELECTOR PLAN 3
Remote, 1984.
Patient has a Hx. of a Gyn malignancy found on cytology of peritoneal fluid more than a year ago. A mass or masses in the abdomen have yet to be documented however. She has not received any treatment for this finding.

## 2017-01-21 NOTE — DISCHARGE NOTE ADULT - PLAN OF CARE
You have high blood pressure, however, your blood pressure was on the lower side this visit so your home medications of metoprolol 100mg xl, olemsartan 20mg, lasix 40mg and norvasc 5mg  were help for systolic pressures of 90-100s. Please see your PMD upon discharge for restarting these medications. Please check your BP regularly. 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 Peritoneal fluid cytology revealed malignant cells c/w adenocarcinoma. IHC staining suggested a Mullerian/ovarian origin. You did not start therapy for this cancer. It could be your presenting symptoms are a result of this. We have spoken at length at bedside with your son about recommendations for CT abdomen/pelvis with contrast during this hospital stay as recommended per Dr. Doan. You stated you will perform these tests as an outpatient as you were feeling better and wanted to leave the hospital to a more comfortable environment. You had a  drawn that you will follow-up as outpatient for results. Please continue your treatment per your primary care doctor. You came in for weakness and dehydration You were given 2L on fluids with improvement of your symptoms. You were seen by physical therapy who recommended you be discharged emily with PT. As stated below, it was not confirmed if your symptoms were from deconditioning, a malignancy or another origin. It was recommended you have further imaging but after a lengthy discussion at bedside the decision was made to continue these studies as an outpatient. Thus you will be discharge home with physical therapy since you are feeling better. It is recommended you see Dr. Doan for outpatient CT imaging upon discharge. You have high blood pressure, however, your blood pressure was on the lower side this visit so your home medications of metoprolol 100mg xl, olemsartan 20mg, lasix 40mg and norvasc 5mg  were help for systolic pressures of 90-100s. Your metoprolol was halved to 50mg xl. Please see your PMD upon discharge for restarting these medications. Please check your BP regularly. Please continue your treatment per your primary care doctor. Your metoprolol was halved to 50mg xl due to your blood pressure and heart rate. Please follow-up with your PMD about restarting your antihypertensive meds and check your BP. You were given 2L on fluids with improvement of your symptoms. You were seen by physical therapy who recommended you be discharged home with outpatient PT. You were seen by nutrition who recommended you increase your diet to include Ensure shakes three time daily as supplementation. As stated below, it was not confirmed if your symptoms were from deconditioning, a malignancy or another origin. It was recommended you have further imaging but after a lengthy discussion at bedside the decision was made to continue these studies as an outpatient. Thus you will be discharge home with physical therapy as outpatient since you are feeling better. It is recommended you see Dr. Doan for outpatient CT imaging upon discharge. You have high blood pressure, however, your blood pressure was on the lower side this visit so your home medications of metoprolol 100mg xl, olemsartan 20mg, lasix 40mg and norvasc 5mg were held for systolic pressures of 90-100s. You were continued on metoprolol that was halved to 50mg xl. Please see your PMD upon discharge for restarting these medications. Please check your BP regularly.

## 2017-01-21 NOTE — DISCHARGE NOTE ADULT - CARE PROVIDERS DIRECT ADDRESSES
,DirectAddress_Unknown,[email or web address],DirectAddress_Unknown

## 2017-01-21 NOTE — DISCHARGE NOTE ADULT - MEDICATION SUMMARY - MEDICATIONS TO TAKE
I will START or STAY ON the medications listed below when I get home from the hospital:    compression stockings   -- 1 each once a day  -- Indication: For Need for prophylactic measure    Benicar  -- milligram(s) by mouth once a day  -- Indication: For Hypertension    Xarelto 20 mg oral tablet  -- 1 tab(s) by mouth once a day (in the evening)  -- Indication: For Atrial fibrillation    metoprolol succinate 100 mg oral tablet, extended release  -- 1 tab(s) by mouth once a day  -- Indication: For Hypertension     levothyroxine 88 mcg (0.088 mg) oral capsule  -- 1 cap(s) by mouth once a day  -- Indication: For Hypothyroidism

## 2017-01-21 NOTE — DISCHARGE NOTE ADULT - CARE PROVIDER_API CALL
Damian Del Rio), Hematology; Internal Medicine; Medical Oncology  [address] [place] [address]  Phone: [phone]  Fax: [phone]    Scooter Mcdonough), Internal Medicine  [address] [address]  Phone: [phone]  Fax: [phone]

## 2017-01-21 NOTE — DISCHARGE NOTE ADULT - MEDICATION SUMMARY - MEDICATIONS TO CHANGE
I will SWITCH the dose or number of times a day I take the medications listed below when I get home from the hospital:  None I will SWITCH the dose or number of times a day I take the medications listed below when I get home from the hospital:    Benicar  -- milligram(s) by mouth once a day    metoprolol succinate 100 mg oral tablet, extended release  -- 1 tab(s) by mouth once a day

## 2017-01-21 NOTE — PROGRESS NOTE ADULT - SUBJECTIVE AND OBJECTIVE BOX
[facility] ATTENDING - called by Dr. Finnegan    89 year old woman who was dehydrated and admitted for rehydration. Has AFIB, hypotension, diastolic CHF  Has bronchiectasis. Runny nose. +SOB with small activities.    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 Never smoked, but second hand exposure. no asbestos    REVIEW OF SYSTEMS:  Constitutional: +-weight change, +weight loss, -fever, -chills, +fatigue, -night sweats  Eyes: visual defects, -discharge  ENT:  +hearing difficulty, -tinnitus, -vertigo, CONSTANTLY RUNNY NOSE -sinus pain, -throat pain, -epistaxis, -dysphagia, -hoarseness  Neck: -pain, -stiffness, -neck swelling  Respiratory: -cough, -wheezing, -hemoptysis      Cardiovascular: -chest pain, -dysrhythmia, -palpitations, +dizziness   Gastrointestinal: occasional abdominal pain, -nausea, -vomiting, -hematemesis, -diarrhea, -constipation. -melena  Genitourinary: -dysuria, -frequency, -hematuria, -incontinence      Neurologic: -headache, -memory loss, +loss of strength, -numbness, -tremor     Skin: -itching, -burning, -rash, -lesions   Lymphatic: -enlarged lymph nodes  Endocrine: -hair loss, -temperature intolerance, -hair loss         Musculoskeletal: -back pain, -joint pain, -extremity pain  Psychiatric: -visual change, -auditory change, -depression, -anxiety, -suicidal  Sleep: -disorder, -insomnia, -sleep deprivation  Heme/Lymph: -easy bruising, -bleeding gums            Allergy and Immunologic: -hives, -eczema    Vital Signs Last 24 Hrs  T(C): 36.4, Max: 36.8 ( @ 19:41)  T(F): 97.5, Max: 98.2 ( @ 19:41)  HR: 55 (55 - 76)  BP: 92/55 (92/55 - 118/70)  BP(mean): --  RR: 18 (16 - 18)  SpO2: 96% (94% - 96%)    I&O's Detail    PHYSICAL EXAM:  In bed, surrounded by family  Malnourished, comfortable, - acute distress; vital signs are monitored  Eyes: PERRLA, EOMI, -conjunctivitis, -scleritis   Head: no focal deficit, normocephalic,  no trauma  ENMT: moist tongue, no thrush, -nasal discharge, -hoarseness, normal hearing, -cough, -hemoptysis, trachea midline  Neck: supple, - lymphadenopathy,  -masses, -JVD  Respiratory: bilateral diminished breath sounds, -wheezing, +rhonchi, +rales, +crackles  Chest: -accessory muscle use, -paradoxical breathing  Cardiovascular: irregular rate, S1 S2 normal, -S3, -S4, -murmur, -gallop, -rub  Gastrointestinal: soft, nontender, VERY distended, normal bowel sounds, no hepatosplenomegaly, COLOSTOMY  Genitourinary: -flank pain, -dysuria  Extremities: -clubbing, -cyanosis, -edema    Vascular: peripheral pulses palpable 2+ bilaterally  Neurological: alert, oriented x 3, no focal deficit, -tremor   Skin: warm, dry, -erythema, iv site intact  Lymph nodes; no cervical, supraclavicular or axillary adenopathy  Psychiatric: cooperative, appropriate mood    MEDICATIONS  (STANDING):  rivaroxaban 15milliGRAM(s) Oral daily  levothyroxine 100MICROGram(s) Oral daily  metoprolol succinate ER 50milliGRAM(s) Oral daily    MEDICATIONS  (PRN):    Allergies  penicillins (Hives)  Intolerances    LABS:                        12.0   4.7   )-----------( 142      ( 2017 07:41 )             36.0     2017 07:48    143    |  115    |  32     ----------------------------<  86     3.6     |  17     |  1.11     Ca    8.1        2017 07:48  Phos  3.5       2017 07:41  Mg     1.7       2017 07:48  TPro  6.4    /  Alb  2.9    /  TBili  0.4    /  DBili  x      /  AST  22     /  ALT  25     /  AlkPhos  73     2017 23:42  PT/INR - ( 2017 07:41 )   PT: 24.7 sec;   INR: 2.21     PTT - ( 2017 23:42 )  PTT:37.7 sec    Urinalysis Basic - ( 2017 23:02 )  Color: Yellow / Appearance: Clear / S.015 / pH: x  Gluc: x / Ketone: NEGATIVE  / Bili: NEGATIVE / Urobili: 0.2 E.U./dL   Blood: x / Protein: NEGATIVE mg/dL / Nitrite: NEGATIVE   Leuk Esterase: Small / RBC: < 5 /HPF / WBC > 10 /HPF   Sq Epi: x / Non Sq Epi: Rare /HPF / Bacteria: Present /HPF    +DVT prophylaxis OA  +Sleep "fine"  +Nutrition goals, oral  -Pain  -Decubital ulcer  +GI prophylaxis (PPV, coagulopathy, Hx)  +Aspiration prophylaxis (45 degrees)    +Sedation/analgesia   +ID (phos, CH, mupi, SB)  -Delirium    +Cardiac Beta  +Prevention  +Education  +Medication reviewed (drug-drug interactions, PDA)  Medical devices    Discussed with PGY, RN, family    RADIOLOGY & ADDITIONAL STUDIES:    CXRs reviewed - there are opacities and nodules bilateral    CT with contrast will be performed as outpatient

## 2017-01-21 NOTE — DISCHARGE NOTE ADULT - HOSPITAL COURSE
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 The patient underwent an exploratory lap on 7/8/15 because of the finding of free air in the abdominal cavity on a CT scan of the abdomen. All that was found was extensive small and large bowel diverticulosis. No perforation was found. Peritoneal fluid cytology revealed malignant cells c/w adenocarcinoma. IHC staining suggested a Mullerian/ovarian origin. The patient has had no treatment for this cancer. She has felt poorly over the last several months. Over the last few weeks she has noted a low BP, weakness, SOB on exertion and a poor appetite. She has lost 10-15 lbs. over the last several months. She recently had leg edema but this is now less. She went to the ER yesterday because of these complaints and was admitted for weakness. Dr. Del Rio was consulted. 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 It was recommended patient undergo CT Scan head/abdomen/pelvis for evaluation along with  level. However, patient was seen by PT who recommended home with PT and patient became very adamant about going home with plans to continue work-up as outpatient. Spoke extensively with son and son’s wife at beside who said they would take her for imaging upon discharge as patient was very tired of hospital and wishing to be alone and not stay weekend. Furthermore, patient stated she would not want to undergo treatment if this was in fact malignant in nature but would like to know for diagnostic purposes. She will follow up with Dr. Mcdonough and Dr. Del Rio as outpatient. 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 The patient underwent an exploratory lap on 7/8/15 because of the finding of free air in the abdominal cavity on a CT scan of the abdomen. All that was found was extensive small and large bowel diverticulosis. No perforation was found. Peritoneal fluid cytology revealed malignant cells c/w adenocarcinoma. IHC staining suggested a Mullerian/ovarian origin. The patient has had no treatment for this cancer. She has felt poorly over the last several months. Over the last few weeks she has noted a low BP, weakness, SOB on exertion and a poor appetite. She has lost 10-15 lbs. over the last several months. She recently had leg edema but this is now less. She went to the ER yesterday because of these complaints and was admitted for weakness. Dr. Del Rio was consulted. 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 It was recommended patient undergo CT Scan head/abdomen/pelvis for evaluation along with  level. However, patient was seen by PT who recommended with outpatient PT and patient became very adamant about going home with plans to continue work-up as outpatient. Spoke extensively with son and son’s wife at beside who said they would take her for imaging upon discharge as patient was very tired of hospital and wishing to be alone and not stay weekend. Furthermore, patient stated she would not want to undergo treatment if this was in fact malignant in nature but would like to know for diagnostic purposes. Patient had blood drawn for  prior to discharge.  She will follow up with Dr. Mcdonough and Dr. Del Rio as outpatient. A script was written for outpatient PT.

## 2017-01-21 NOTE — DISCHARGE NOTE ADULT - NS MD DC FALL RISK RISK
For information on Fall & Injury Prevention, visit [email or web address]

## 2017-01-23 LAB — CANCER AG125 SERPL-ACNC: 174 U/ML — HIGH

## 2017-01-25 DIAGNOSIS — Z90.12 ACQUIRED ABSENCE OF LEFT BREAST AND NIPPLE: ICD-10-CM

## 2017-01-25 DIAGNOSIS — I95.9 HYPOTENSION, UNSPECIFIED: ICD-10-CM

## 2017-01-25 DIAGNOSIS — I48.91 UNSPECIFIED ATRIAL FIBRILLATION: ICD-10-CM

## 2017-01-25 DIAGNOSIS — I50.30 UNSPECIFIED DIASTOLIC (CONGESTIVE) HEART FAILURE: ICD-10-CM

## 2017-01-25 DIAGNOSIS — Z85.3 PERSONAL HISTORY OF MALIGNANT NEOPLASM OF BREAST: ICD-10-CM

## 2017-01-25 DIAGNOSIS — Z93.3 COLOSTOMY STATUS: ICD-10-CM

## 2017-01-25 DIAGNOSIS — E86.0 DEHYDRATION: ICD-10-CM

## 2017-01-25 DIAGNOSIS — R63.4 ABNORMAL WEIGHT LOSS: ICD-10-CM

## 2017-01-25 DIAGNOSIS — N39.0 URINARY TRACT INFECTION, SITE NOT SPECIFIED: ICD-10-CM

## 2017-01-25 DIAGNOSIS — R53.1 WEAKNESS: ICD-10-CM

## 2017-01-25 DIAGNOSIS — N17.9 ACUTE KIDNEY FAILURE, UNSPECIFIED: ICD-10-CM

## 2017-01-25 DIAGNOSIS — I10 ESSENTIAL (PRIMARY) HYPERTENSION: ICD-10-CM

## 2017-01-25 DIAGNOSIS — C56.9 MALIGNANT NEOPLASM OF UNSPECIFIED OVARY: ICD-10-CM

## 2017-01-25 DIAGNOSIS — K57.30 DIVERTICULOSIS OF LARGE INTESTINE WITHOUT PERFORATION OR ABSCESS WITHOUT BLEEDING: ICD-10-CM

## 2017-01-25 DIAGNOSIS — Z88.0 ALLERGY STATUS TO PENICILLIN: ICD-10-CM

## 2017-01-25 DIAGNOSIS — E03.9 HYPOTHYROIDISM, UNSPECIFIED: ICD-10-CM

## 2017-01-25 DIAGNOSIS — Z79.01 LONG TERM (CURRENT) USE OF ANTICOAGULANTS: ICD-10-CM

## 2017-01-25 DIAGNOSIS — J47.9 BRONCHIECTASIS, UNCOMPLICATED: ICD-10-CM

## 2017-01-25 DIAGNOSIS — K57.10 DIVERTICULOSIS OF SMALL INTESTINE WITHOUT PERFORATION OR ABSCESS WITHOUT BLEEDING: ICD-10-CM

## 2017-01-25 DIAGNOSIS — Z28.09 IMMUNIZATION NOT CARRIED OUT BECAUSE OF OTHER CONTRAINDICATION: ICD-10-CM

## 2017-01-25 LAB
CULTURE RESULTS: SIGNIFICANT CHANGE UP
CULTURE RESULTS: SIGNIFICANT CHANGE UP
SPECIMEN SOURCE: SIGNIFICANT CHANGE UP
SPECIMEN SOURCE: SIGNIFICANT CHANGE UP

## 2017-01-27 ENCOUNTER — APPOINTMENT (OUTPATIENT)
Dept: OTOLARYNGOLOGY | Facility: CLINIC | Age: 82
End: 2017-01-27

## 2017-01-27 VITALS
WEIGHT: 110 LBS | HEIGHT: 68 IN | DIASTOLIC BLOOD PRESSURE: 52 MMHG | HEART RATE: 68 BPM | OXYGEN SATURATION: 93 % | BODY MASS INDEX: 16.67 KG/M2 | SYSTOLIC BLOOD PRESSURE: 78 MMHG

## 2017-01-27 DIAGNOSIS — H90.3 SENSORINEURAL HEARING LOSS, BILATERAL: ICD-10-CM

## 2017-01-27 DIAGNOSIS — R09.89 OTHER SPECIFIED SYMPTOMS AND SIGNS INVOLVING THE CIRCULATORY AND RESPIRATORY SYSTEMS: ICD-10-CM

## 2017-01-27 DIAGNOSIS — R49.0 DYSPHONIA: ICD-10-CM

## 2017-01-27 DIAGNOSIS — H93.90 UNSPECIFIED DISORDER OF EAR, UNSPECIFIED EAR: ICD-10-CM

## 2017-01-27 DIAGNOSIS — H69.83 OTHER SPECIFIED DISORDERS OF EUSTACHIAN TUBE, BILATERAL: ICD-10-CM

## 2017-01-27 DIAGNOSIS — H93.A3 PULSATILE TINNITUS, BILATERAL: ICD-10-CM

## 2017-01-27 RX ORDER — LEVOTHYROXINE SODIUM 88 UG/1
88 TABLET ORAL
Refills: 0 | Status: ACTIVE | COMMUNITY

## 2017-01-31 ENCOUNTER — MESSAGE (OUTPATIENT)
Age: 82
End: 2017-01-31

## 2017-02-08 ENCOUNTER — APPOINTMENT (OUTPATIENT)
Dept: OTOLARYNGOLOGY | Facility: CLINIC | Age: 82
End: 2017-02-08

## 2017-03-21 ENCOUNTER — MESSAGE (OUTPATIENT)
Age: 82
End: 2017-03-21

## 2017-04-03 ENCOUNTER — MESSAGE (OUTPATIENT)
Age: 82
End: 2017-04-03

## 2025-05-05 NOTE — H&P ADULT. - NEGATIVE GASTROINTESTINAL SYMPTOMS
Group Appointment Information    Date: 05/05/25   Attendance Duration: 60 minutes  Number of Participants: 8 participants  Program / Group: IOP - Intensive Outpatient Program  Topics Covered: Regulating emotions      Group Therapy Start Time: 10:00 AM    Attendance: Attended  Participation: Active verbal participation    Affect/Mood Range: Normal range  Affect/Mood Display: CWC - Congruent w/Content  Cognition: Alert and Oriented    Evidence of imminent suicide risk: No   Evidence of imminent homicide risk: No     Therapeutic Interventions: Emotion clarification and Supportive psychotherapy  Progress Toward Treatment Goal: Mild improvement; pt learned how experiences of AWE and self-awareness can benefit mental wellness.    no abdominal pain/no nausea/no change in bowel habits/no vomiting